# Patient Record
Sex: MALE | Race: WHITE | NOT HISPANIC OR LATINO | Employment: OTHER | URBAN - METROPOLITAN AREA
[De-identification: names, ages, dates, MRNs, and addresses within clinical notes are randomized per-mention and may not be internally consistent; named-entity substitution may affect disease eponyms.]

---

## 2018-09-04 ENCOUNTER — OFFICE VISIT (OUTPATIENT)
Dept: MULTI SPECIALTY CLINIC | Facility: CLINIC | Age: 68
End: 2018-09-04

## 2018-09-04 VITALS
WEIGHT: 141.98 LBS | SYSTOLIC BLOOD PRESSURE: 118 MMHG | HEIGHT: 69 IN | TEMPERATURE: 98.2 F | HEART RATE: 65 BPM | DIASTOLIC BLOOD PRESSURE: 72 MMHG | BODY MASS INDEX: 21.03 KG/M2

## 2018-09-04 DIAGNOSIS — Z71.84 TRAVEL ADVICE ENCOUNTER: Primary | ICD-10-CM

## 2018-09-04 PROCEDURE — 99411 PREVENTIVE COUNSELING GROUP: CPT | Performed by: INTERNAL MEDICINE

## 2018-09-04 RX ORDER — ATOVAQUONE AND PROGUANIL HYDROCHLORIDE 250; 100 MG/1; MG/1
1 TABLET, FILM COATED ORAL
Qty: 12 TABLET | Refills: 1 | Status: SHIPPED | OUTPATIENT
Start: 2018-11-20 | End: 2019-07-02 | Stop reason: ALTCHOICE

## 2018-09-04 RX ORDER — LISINOPRIL 20 MG/1
TABLET ORAL
COMMUNITY
Start: 2018-08-28

## 2018-09-04 RX ORDER — ASPIRIN 81 MG/1
81 TABLET ORAL EVERY OTHER DAY
COMMUNITY

## 2018-09-04 RX ORDER — ATORVASTATIN CALCIUM 20 MG/1
TABLET, FILM COATED ORAL
COMMUNITY
Start: 2018-04-30 | End: 2019-07-02 | Stop reason: SDDI

## 2018-09-04 NOTE — PROGRESS NOTES
Travel Clinic    Patient is traveling to countries or areas within countries where immunizations are required or recommended:   [place], [address]  Patient states they will visit underdeveloped areas with poor sanitation Yes/No: Yes   Patient requires malaria prophylaxis Yes/No: Yes    No orders of the defined types were placed in this encounter        Patient instructed how to take medications Yes/No: Yes  Patient warned about side effects Yes/No: Yes  Patient declined Yes/No: No

## 2019-07-02 ENCOUNTER — OFFICE VISIT (OUTPATIENT)
Dept: INFECTIOUS DISEASES | Facility: CLINIC | Age: 69
End: 2019-07-02

## 2019-07-02 VITALS
HEIGHT: 69 IN | DIASTOLIC BLOOD PRESSURE: 68 MMHG | BODY MASS INDEX: 20.53 KG/M2 | RESPIRATION RATE: 20 BRPM | HEART RATE: 62 BPM | TEMPERATURE: 98.7 F | WEIGHT: 138.6 LBS | SYSTOLIC BLOOD PRESSURE: 118 MMHG

## 2019-07-02 DIAGNOSIS — Z23 ENCOUNTER FOR VACCINATION: ICD-10-CM

## 2019-07-02 DIAGNOSIS — Z71.84 TRAVEL ADVICE ENCOUNTER: Primary | ICD-10-CM

## 2019-07-02 PROCEDURE — 90690 TYPHOID VACCINE ORAL: CPT

## 2019-07-02 PROCEDURE — 90473 IMMUNE ADMIN ORAL/NASAL: CPT

## 2019-07-02 PROCEDURE — 99411 PREVENTIVE COUNSELING GROUP: CPT | Performed by: INTERNAL MEDICINE

## 2019-07-02 RX ORDER — ATOVAQUONE AND PROGUANIL HYDROCHLORIDE 250; 100 MG/1; MG/1
1 TABLET, FILM COATED ORAL
Qty: 20 TABLET | Refills: 1 | Status: SHIPPED | OUTPATIENT
Start: 2019-08-28 | End: 2019-09-17

## 2019-07-02 RX ORDER — HYDROCHLOROTHIAZIDE 25 MG/1
25 TABLET ORAL DAILY
COMMUNITY

## 2019-07-02 NOTE — PROGRESS NOTES
Travel Clinic    Patient is traveling to countries or areas within countries where immunizations are required or recommended:   [facility]    Patient states they will visit underdeveloped areas with poor sanitation Yes/No: Yes   Patient requires malaria prophylaxis Yes/No: Yes    No orders of the defined types were placed in this encounter        Patient instructed how to take medications Yes/No: Yes  Patient warned about side effects Yes/No: Yes  Patient declined Yes/No: No

## 2024-09-16 ENCOUNTER — OFFICE VISIT (OUTPATIENT)
Dept: PODIATRY | Facility: CLINIC | Age: 74
End: 2024-09-16
Payer: MEDICARE

## 2024-09-16 VITALS
DIASTOLIC BLOOD PRESSURE: 76 MMHG | RESPIRATION RATE: 18 BRPM | BODY MASS INDEX: 20.44 KG/M2 | HEIGHT: 69 IN | HEART RATE: 72 BPM | WEIGHT: 138 LBS | SYSTOLIC BLOOD PRESSURE: 145 MMHG

## 2024-09-16 DIAGNOSIS — B35.1 ONYCHOMYCOSIS: Primary | ICD-10-CM

## 2024-09-16 DIAGNOSIS — B35.3 TINEA PEDIS OF BOTH FEET: ICD-10-CM

## 2024-09-16 PROCEDURE — 99203 OFFICE O/P NEW LOW 30 MIN: CPT | Performed by: PODIATRIST

## 2024-09-16 NOTE — PROGRESS NOTES
"Ambulatory Visit  Name: Reggie Rosales      : 1950      MRN: [id number]  Encounter Provider: Robin Victoria DPM  Encounter Date: 2024   Encounter department: [facility] [place]    Explained the patient that he is dealing with both onychomycosis and chronic tinea pedis.  Discussed treatment options.  Explained that topical medication does not help the nail fungus but could help the skin.  In order to alleviate both concerns, recommended oral Lamisil.  Explained that this medication has a 60% success rate for toenails and we will not know for 9 months if it is effective.  Also, hepatic function panel necessary before beginning prescription.  Prescribed this blood test and patient will be contacted with results.  Reappoint as needed    Assessment & Plan  Onychomycosis    Orders:    Hepatic function panel; Future    Tinea pedis of both feet           History of Present Illness     Reggie Rosales is a 74 y.o. male who presents with concern regarding his toenails.  Dermatologist advised him to see a podiatrist due to the thick and yellow toenails as well as dry skin on the soles of his feet.  The nails have been thick and yellow for years.  They are not painful.  He denies any pruritus from his feet.  He has tried soaking his feet in vinegar in the past to no avail.      Review of Systems   Gastrointestinal: Negative.    Musculoskeletal: Negative.    Skin:  Positive for rash.   Psychiatric/Behavioral: Negative.                 Objective     /76   Pulse 72   Resp 18   Ht 5' 9\" (1.753 m)   Wt 62.6 kg (138 lb)   BMI 20.38 kg/m²     Physical Exam  Constitutional:       Appearance: Normal appearance.   Cardiovascular:      Pulses: Normal pulses.   Musculoskeletal:         General: Normal range of motion.   Skin:     Findings: Rash present.      Comments: All toenails on left foot are thick and yellow with subungual debris.  The third fourth and fifth toenails of the right foot are " similarly affected.  Skin on the soles of his feet dry and scaly.   Neurological:      General: No focal deficit present.      Mental Status: He is oriented to person, place, and time.